# Patient Record
Sex: MALE | Race: WHITE | NOT HISPANIC OR LATINO | Employment: FULL TIME | URBAN - METROPOLITAN AREA
[De-identification: names, ages, dates, MRNs, and addresses within clinical notes are randomized per-mention and may not be internally consistent; named-entity substitution may affect disease eponyms.]

---

## 2020-10-29 ENCOUNTER — OFFICE VISIT (OUTPATIENT)
Dept: FAMILY MEDICINE | Facility: CLINIC | Age: 25
End: 2020-10-29
Payer: COMMERCIAL

## 2020-10-29 VITALS
TEMPERATURE: 98.7 F | HEART RATE: 107 BPM | RESPIRATION RATE: 14 BRPM | OXYGEN SATURATION: 98 % | SYSTOLIC BLOOD PRESSURE: 116 MMHG | HEIGHT: 72 IN | BODY MASS INDEX: 33.92 KG/M2 | WEIGHT: 250.4 LBS | DIASTOLIC BLOOD PRESSURE: 72 MMHG

## 2020-10-29 DIAGNOSIS — L03.031 PARONYCHIA OF TOE, RIGHT: Primary | ICD-10-CM

## 2020-10-29 DIAGNOSIS — L03.031 CELLULITIS OF TOE OF RIGHT FOOT: ICD-10-CM

## 2020-10-29 PROCEDURE — 99213 OFFICE O/P EST LOW 20 MIN: CPT | Performed by: FAMILY MEDICINE

## 2020-10-29 RX ORDER — CEPHALEXIN 500 MG/1
500 CAPSULE ORAL 2 TIMES DAILY
Qty: 20 CAPSULE | Refills: 0 | Status: SHIPPED | OUTPATIENT
Start: 2020-10-29 | End: 2020-11-08

## 2020-10-29 ASSESSMENT — MIFFLIN-ST. JEOR: SCORE: 2168.81

## 2020-10-29 NOTE — PATIENT INSTRUCTIONS
Patient Education     Paronychia of the Finger or Toe  Paronychia is an infection near a fingernail or toenail. It usually occurs when an opening in the cuticle or an ingrown toenail lets bacteria under the skin.   The infection will need to be drained if pus is present. If the infection has been caught early, you may need only antibiotic treatment. Healing will take about 1 to 2 weeks.   Home care  Follow these guidelines when caring for yourself at home:     Clean and soak the toe or finger. Do this 2 times a day for the first 3 days. To do so:  ? Soak your foot or hand in a tub of warm water for 5 minutes. Or hold your toe or finger under a faucet of warm running water for 5 minutes.  ? Clean any crust away with soap and water using a cotton swab.  ? Put antibiotic ointment on the infected area.    Change the dressing daily or any time it gets dirty.    If you were given antibiotics, take them as directed until they are all gone.    If your infection is on a toe, wear comfortable shoes with a lot of toe room. You can also wear open-toed sandals while your toe heals.    You may use over-the-counter medicine (acetaminophen or ibuprofen) to help with pain, unless another medicine was prescribed. If you have chronic liver or kidney disease, talk with your healthcare provider before using these medicines. Also talk with your provider if you've had a stomach ulcer or gastrointestinal bleeding.  Prevention  The following can prevent paronychia:     Don't cut or play with your cuticles at home. A healthy cuticle maintains a seal between your skin and nail and keeps out infection.    Don't bite your nails.    Don't suck on your thumbs or fingers.  Follow-up care  Follow up with your healthcare provider, or as advised.   When to seek medical advice  Call your healthcare provider right away if any of these occur:     Redness, pain, or swelling of the finger or toe gets worse    You have trouble moving or bending the  finger or toe    Red streaks in the skin leading away from the wound    Pus or fluid draining from the nail area    Fever of 100.4 F (38 C) or higher, or as directed by your provider  [employer] last reviewed this educational content on 7/1/2019 2000-2020 [employer]. [address] [place] [id number]. All rights reserved. This information is not intended as a substitute for professional medical care. Always follow your healthcare professional's instructions.

## 2020-10-29 NOTE — PROGRESS NOTES
Subjective     Neel Masters III is a 24 year old male who presents to clinic today for the following health issues:    HPI         Concern - Right big to infection  Onset: 3 days  Description: Swelling, redness, yellow/white discharge  Intensity: moderate  Progression of Symptoms:  worsening  Accompanying Signs & Symptoms:   Previous history of similar problem:   Precipitating factors:        Worsened by:   Alleviating factors:        Improved by:   Therapies tried and outcome: ibuprofen    Pain/redness/swelling tip of right big toe  No systemic signs    Review of Systems   Constitutional, HEENT, cardiovascular, pulmonary, gi and gu systems are negative, except as otherwise noted.      Objective    /72   Pulse 107   Temp 98.7  F (37.1  C) (Oral)   Resp 14   Ht 1.829 m (6')   Wt 113.6 kg (250 lb 6.4 oz)   SpO2 98%   BMI 33.96 kg/m    Body mass index is 33.96 kg/m .  Physical Exam   GENERAL: healthy, alert and no distress  EYES: Eyes grossly normal to inspection, PERRL and conjunctivae and sclerae normal  NECK: no adenopathy, no asymmetry, masses, or scars and thyroid normal to palpation  MS: no gross musculoskeletal defects noted, no edema  SKIN: left big toe swelling/tenderness  NEURO: Normal strength and tone, mentation intact and speech normal  PSYCH: mentation appears normal, affect normal/bright          Assessment & Plan       ICD-10-CM    1. Paronychia of toe, right  L03.031 cephALEXin (KEFLEX) 500 MG capsule     Orthopedic & Spine  Referral   2. Cellulitis of toe of right foot  L03.031 Orthopedic & Spine  Referral                   Return in about 1 week (around 11/5/2020) for With Specialist.    Ameya Miranda MD  [facility]

## 2020-12-14 ENCOUNTER — HEALTH MAINTENANCE LETTER (OUTPATIENT)
Age: 25
End: 2020-12-14

## 2021-10-02 ENCOUNTER — HEALTH MAINTENANCE LETTER (OUTPATIENT)
Age: 26
End: 2021-10-02

## 2022-01-22 ENCOUNTER — HEALTH MAINTENANCE LETTER (OUTPATIENT)
Age: 27
End: 2022-01-22

## 2022-09-03 ENCOUNTER — HEALTH MAINTENANCE LETTER (OUTPATIENT)
Age: 27
End: 2022-09-03

## 2023-04-29 ENCOUNTER — HEALTH MAINTENANCE LETTER (OUTPATIENT)
Age: 28
End: 2023-04-29

## 2024-02-01 ENCOUNTER — OFFICE VISIT (OUTPATIENT)
Dept: FAMILY MEDICINE | Facility: CLINIC | Age: 29
End: 2024-02-01
Payer: COMMERCIAL

## 2024-02-01 VITALS
WEIGHT: 274 LBS | RESPIRATION RATE: 20 BRPM | DIASTOLIC BLOOD PRESSURE: 88 MMHG | HEART RATE: 113 BPM | OXYGEN SATURATION: 98 % | BODY MASS INDEX: 37.11 KG/M2 | HEIGHT: 72 IN | SYSTOLIC BLOOD PRESSURE: 128 MMHG | TEMPERATURE: 98.3 F

## 2024-02-01 DIAGNOSIS — H65.05 RECURRENT ACUTE SEROUS OTITIS MEDIA OF LEFT EAR: Primary | ICD-10-CM

## 2024-02-01 PROCEDURE — 99203 OFFICE O/P NEW LOW 30 MIN: CPT | Performed by: NURSE PRACTITIONER

## 2024-02-01 RX ORDER — ECHINACEA PURPUREA EXTRACT 125 MG
2 TABLET ORAL DAILY PRN
Qty: 30 ML | Refills: 1 | Status: SHIPPED | OUTPATIENT
Start: 2024-02-01

## 2024-02-01 RX ORDER — FLUTICASONE PROPIONATE 50 MCG
1 SPRAY, SUSPENSION (ML) NASAL DAILY
Qty: 11.1 ML | Refills: 0 | Status: SHIPPED | OUTPATIENT
Start: 2024-02-01 | End: 2024-03-25

## 2024-02-01 ASSESSMENT — PAIN SCALES - GENERAL: PAINLEVEL: NO PAIN (0)

## 2024-02-01 NOTE — PROGRESS NOTES
"  Assessment & Plan     Recurrent acute serous otitis media of left ear  -concern for mass vs scarring of left ear drum, possible cholesteatoma vs scarring of TM in setting of worsening tinnitus and recurrent otitis media. Recommend follow up with ENT.   -try daily fluticasone nasal spray and/or nasal saline lavages to help open up sinuses and let middle ear fluid drain.           Nicotine/Tobacco Cessation  He reports that he has been smoking other. He has never used smokeless tobacco.  Nicotine/Tobacco Cessation Plan  Recommend reducing vaping as this irritates ears.       BMI  Estimated body mass index is 37.68 kg/m  as calculated from the following:    Height as of this encounter: 1.816 m (5' 11.5\").    Weight as of this encounter: 124.3 kg (274 lb).             Messi Sam is a 28 year old, presenting for the following health issues:  Ear Problem        2/1/2024    10:50 AM   Additional Questions   Roomed by Gabby THORNTON   Accompanied by self     Left ear muffled, every once in awhile pain. Was treated with antibiotics for ear infection twice but symptoms did not improve or change. Left ear is ringing.     Symptoms ongoing for almost 3 weeks.     No dizziness or balance problems.   No known injury to ear.     Last dose of Augmentin yesterday.     He had his left ear flushed. Left ear had a lot of pain.         History of Present Illness       Reason for visit:  Ear infection not clearing up  Symptom onset:  1-2 weeks ago  Symptoms include:  Trouble hearing, pain, fatigue  Symptom intensity:  Moderate  Symptom progression:  Staying the same  Had these symptoms before:  No    He eats 0-1 servings of fruits and vegetables daily.He consumes 2 sweetened beverage(s) daily.He exercises with enough effort to increase his heart rate 9 or less minutes per day.  He exercises with enough effort to increase his heart rate 3 or less days per week.   He is taking medications regularly.       Left ear           Review of " "Systems  Constitutional, neuro, ENT, endocrine, pulmonary, cardiac, gastrointestinal, genitourinary, musculoskeletal, integument and psychiatric systems are negative, except as otherwise noted.      Objective    /88   Pulse 113   Temp 98.3  F (36.8  C) (Tympanic)   Resp 20   Ht 1.816 m (5' 11.5\")   Wt 124.3 kg (274 lb)   SpO2 98%   BMI 37.68 kg/m    Body mass index is 37.68 kg/m .  Physical Exam  HENT:      Right Ear: A middle ear effusion is present. Tympanic membrane is not injected, scarred, perforated, erythematous, retracted or bulging.      Left Ear: A middle ear effusion is present. Tympanic membrane is injected and erythematous. Tympanic membrane is not scarred, perforated, retracted or bulging.      Ears:                      Signed Electronically by: JUNO Clancy CNP    "

## 2024-03-25 DIAGNOSIS — H65.05 RECURRENT ACUTE SEROUS OTITIS MEDIA OF LEFT EAR: ICD-10-CM

## 2024-03-25 RX ORDER — FLUTICASONE PROPIONATE 50 MCG
1 SPRAY, SUSPENSION (ML) NASAL DAILY
Qty: 33.3 ML | Refills: 2 | Status: SHIPPED | OUTPATIENT
Start: 2024-03-25

## 2024-07-05 ENCOUNTER — HOSPITAL ENCOUNTER (EMERGENCY)
Facility: CLINIC | Age: 29
Discharge: HOME | End: 2024-07-05
Attending: NURSE PRACTITIONER | Admitting: NURSE PRACTITIONER
Payer: COMMERCIAL

## 2024-07-05 VITALS
SYSTOLIC BLOOD PRESSURE: 136 MMHG | TEMPERATURE: 98.7 F | HEART RATE: 72 BPM | DIASTOLIC BLOOD PRESSURE: 87 MMHG | OXYGEN SATURATION: 99 %

## 2024-07-05 DIAGNOSIS — A69.20 ERYTHEMA MIGRANS (LYME DISEASE): ICD-10-CM

## 2024-07-05 PROCEDURE — G0463 HOSPITAL OUTPT CLINIC VISIT: HCPCS | Performed by: NURSE PRACTITIONER

## 2024-07-05 PROCEDURE — 99213 OFFICE O/P EST LOW 20 MIN: CPT | Performed by: NURSE PRACTITIONER

## 2024-07-05 RX ORDER — DOXYCYCLINE 100 MG/1
100 CAPSULE ORAL 2 TIMES DAILY
Qty: 28 CAPSULE | Refills: 0 | Status: SHIPPED | OUTPATIENT
Start: 2024-07-05 | End: 2024-07-19

## 2024-07-05 NOTE — DISCHARGE INSTRUCTIONS
Recommend follow-up in your primary clinic in the next 14 days as they may want to extend antibiotic coverage.  Treating for Lyme's disease to prevent worsening conditions is recommended with erythema migrans(bull's-eye pattern rash).  Please return if you develop fevers, chills, neurologic changes such as vision changes or joint pain despite being on antibiotics.

## 2024-07-06 ENCOUNTER — HEALTH MAINTENANCE LETTER (OUTPATIENT)
Age: 29
End: 2024-07-06

## 2024-07-06 NOTE — ED PROVIDER NOTES
ED Provider Note  [facility]      History     Chief Complaint   Patient presents with    Wound Check     Both legs.      HPI  Neel Masters III is a 28 year old male who presents with several red bull's-eye patterned rashes on the lower legs.  Denies fever, chills, headache, nausea, vomiting, diarrhea, abdominal pain.  Also has some bug bites that are pruritic and draining honey colored drainage.  Patient reports that he is unsure if he has had ticks on him recently.  Reports that he has tried steroid creams on red rashes on his legs without improvement.            Allergies:  No Known Allergies    Problem List:    There are no problems to display for this patient.       Past Medical History:    Past Medical History:   Diagnosis Date    Depressive disorder 2019       Past Surgical History:    History reviewed. No pertinent surgical history.    Family History:    Family History   Problem Relation Age of Onset    Diabetes Mother     Other Cancer Mother     Depression Mother     Anxiety Disorder Mother     Diabetes Father     Other Cancer Maternal Grandfather     Diabetes Maternal Grandmother     Other Cancer Maternal Grandmother     Hypertension Paternal Grandfather        Social History:  Marital Status:  Single [1]  Social History     Tobacco Use    Smoking status: Every Day     Types: Vaping Device    Smokeless tobacco: Never   Vaping Use    Vaping status: Every Day   Substance Use Topics    Alcohol use: Yes    Drug use: Yes     Types: Marijuana        Medications:    doxycycline hyclate (VIBRAMYCIN) 100 MG capsule  fluticasone (FLONASE) 50 MCG/ACT nasal spray  sodium chloride (OCEAN) 0.65 % nasal spray          Review of Systems  A medically appropriate review of systems was performed with pertinent positives and negatives noted in the HPI, and all other systems negative.    Physical Exam   Patient Vitals for the past 24 hrs:   BP Temp Temp src Pulse SpO2   07/05/24 1626 136/87 98.7  F  (37.1  C) Tympanic 72 99 %          Physical Exam  General: alert and in no acute distress on arrival  Ears/Nose/Throat: ENT: Left Ear: TM intact, middle ear is not erythremic, no purulence, canal patent. Right Ear: TM intact, middle ear is not erythremic, no purulence, canal patent. Nose: No erythema or edema patent nostrils bilateral. Throat: midline uvula, non-erythremic, non-enlarged tonsils, without exudate.  No cervical adenopathy.   Head: atraumatic, normocephalic, Eyes:  non-traumatic.  Left Eyes: Non-reddened conjunctiva, no icterus, noninjected, normal pupillary response to light. Normal extraocular movement.  Right eye: Non-reddened conjunctiva, no icterus, noninjected, normal pupillary response to light. Normal extraocular movement bilateral. No drainage present.   Lungs:  nonlabored, CTA bilateral throughout.  CV: Regular rate and rhythm, normal S1, S2 no murmurs, extremities warm and perfused  Abd: nondistended, nontender.  Skin: Erythema migrans rashes, (2) 1 on each lower extremity.  No diaphoresis and skin color normal  Neuro: Patient awake, alert, speech is fluent, no focal deficits  Psychiatric: affect/mood normal, appropriate historian.      ED Course                 Procedures                    No results found for this or any previous visit (from the past 24 hour(s)).    MEDICATIONS GIVEN IN THE EMERGENCY DEPARTMENT:  Medications - No data to display             Assessments & Plan (with Medical Decision Making)  28 year old male who presents to the Urgent Care for evaluation of red round rashes on both lower legs.  Erythema migrans pattern bilateral lower legs.  Treated with 100 mg doxycycline twice daily for 14 days recommend follow-up in primary clinic for potentially extended course of antibiotics.       I have reviewed the nursing notes.    I have reviewed the findings, diagnosis, plan and need for follow up with the patient.        NEW PRESCRIPTIONS STARTED AT TODAY'S ER VISIT  Discharge  Medication List as of 7/5/2024  4:49 PM        START taking these medications    Details   doxycycline hyclate (VIBRAMYCIN) 100 MG capsule Take 1 capsule (100 mg) by mouth 2 times daily for 14 days, Disp-28 capsule, R-0, E-Prescribe             Final diagnoses:   Erythema migrans (Lyme disease)       7/5/2024   [facility] EMERGENCY DEPT       Jolene Riddle APRN CNP  07/05/24 [id number]

## 2024-07-12 ENCOUNTER — OFFICE VISIT (OUTPATIENT)
Dept: FAMILY MEDICINE | Facility: CLINIC | Age: 29
End: 2024-07-12
Payer: COMMERCIAL

## 2024-07-12 VITALS
TEMPERATURE: 98.1 F | OXYGEN SATURATION: 99 % | HEART RATE: 95 BPM | SYSTOLIC BLOOD PRESSURE: 126 MMHG | HEIGHT: 71 IN | BODY MASS INDEX: 37.99 KG/M2 | RESPIRATION RATE: 12 BRPM | DIASTOLIC BLOOD PRESSURE: 86 MMHG | WEIGHT: 271.4 LBS
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
## 2024-07-12 PROCEDURE — 99213 OFFICE O/P EST LOW 20 MIN: CPT | Performed by: PHYSICIAN ASSISTANT

## 2024-07-12 RX ORDER — TRIAMCINOLONE ACETONIDE 5 MG/G
CREAM TOPICAL 2 TIMES DAILY
Qty: 30 G | Refills: 0 | Status: SHIPPED | OUTPATIENT
Start: 2024-07-12

## 2024-07-12 RX ORDER — PREDNISONE 20 MG/1
40 TABLET ORAL DAILY
Qty: 10 TABLET | Refills: 0 | Status: SHIPPED | OUTPATIENT
Start: 2024-07-12 | End: 2024-07-17

## 2024-07-12 NOTE — LETTER
July 12, 2024      Neel Masters III  [address] [address]        To Whom It May Concern:    Neel Masters III  was seen on 7/5/24 and 7/12/24.  Please excuse him on 7/6/24, 7/7/24, and 7/8/24 due to illness.         Sincerely,        Lida Christianson PA-C

## 2024-07-12 NOTE — PROGRESS NOTES
"  Assessment & Plan     Bug bite with infection, initial encounter  This does not appear to be related to Lymes especially with multiple bites and never seeing any ticks. Suggest steroid to decrease swelling, continue antibiotic. See me back in one week.   - predniSONE (DELTASONE) 20 MG tablet; Take 2 tablets (40 mg) by mouth daily for 5 days  - triamcinolone (ARISTOCORT HP) 0.5 % external cream; Apply topically 2 times daily        MED REC REQUIRED  Post Medication Reconciliation Status:  Discharge medications reconciled, continue medications without change  BMI  Estimated body mass index is 37.33 kg/m  as calculated from the following:    Height as of this encounter: 1.816 m (5' 11.5\").    Weight as of this encounter: 123.1 kg (271 lb 6.4 oz).   Weight management plan: Discussed healthy diet and exercise guidelines          Messi Sam is a 28 year old, presenting for the following health issues:  ER F/U        7/12/2024     9:20 AM   Additional Questions   Roomed by An V.         7/12/2024     9:20 AM   Patient Reported Additional Medications   Patient reports taking the following new medications none     HPI       ED/UC Followup:    Facility:  [facility] ER  Date of visit: 07/05/2024  Reason for visit: Red Rash on lower legs   Current Status: improving with taking doxycyline       Patient has been taking doxycycline for approx 7 days. Feels the pain around the spots have improved - especially on the left leg. Redness still persists.   He has a lot of itching.     Never developed a fever, chills. Never saw any ticks. Does state there were a lot of bugs, mosquitos that were biting.         Review of Systems  Constitutional, HEENT, cardiovascular, pulmonary, gi and gu systems are negative, except as otherwise noted.      Objective    /86   Pulse 95   Temp 98.1  F (36.7  C) (Oral)   Resp 12   Ht 1.816 m (5' 11.5\")   Wt 123.1 kg (271 lb 6.4 oz)   SpO2 99%   BMI 37.33 kg/m    Body mass " index is 37.33 kg/m .  Physical Exam   GENERAL: alert and no distress  SKIN: see photos     Media Information      Document Information    Other: Photograph   Left leg   07/12/2024 9:44 AM   Attached To:   Office Visit on 7/12/24 with Lida Christianson PA-C   Source Information    Lida Christianson PA-C [facility]   Document History         Media Information      Document Information    Other: Photograph   Right leg   07/12/2024 9:44 AM   Attached To:   Office Visit on 7/12/24 with Lida Christianson PA-C   Source Information    Lida Christianson PA-C [facility]   Document History                Signed Electronically by: Lida Christianson PA-C

## 2025-07-13 ENCOUNTER — HEALTH MAINTENANCE LETTER (OUTPATIENT)
Age: 30
End: 2025-07-13